# Patient Record
Sex: FEMALE | URBAN - METROPOLITAN AREA
[De-identification: names, ages, dates, MRNs, and addresses within clinical notes are randomized per-mention and may not be internally consistent; named-entity substitution may affect disease eponyms.]

---

## 2018-02-28 NOTE — PATIENT DISCUSSION
CONJUNCTIVITIS (ALLERGIC), Ou__:  PRESCRIBE __maxitrol oint qhs, ou___. RETURN FOR FOLLOW-UP AS SCHEDULED.

## 2019-11-20 ENCOUNTER — IMPORTED ENCOUNTER (OUTPATIENT)
Dept: URBAN - METROPOLITAN AREA CLINIC 1 | Facility: CLINIC | Age: 34
End: 2019-11-20

## 2019-11-20 PROBLEM — H52.13: Noted: 2019-11-20

## 2019-11-20 PROCEDURE — S0621 ROUTINE OPHTHALMOLOGICAL EXA: HCPCS

## 2019-11-20 NOTE — PATIENT DISCUSSION
1. Myopia -- Rx was given for correction if indicated and requested. Return for an appointment in 1 year for a 36 with Dr. Joni Schaumann.

## 2022-04-02 ASSESSMENT — TONOMETRY
OD_IOP_MMHG: 16
OS_IOP_MMHG: 16

## 2022-04-02 ASSESSMENT — VISUAL ACUITY
OD_SC: 20/20
OD_CC: 20/20
OS_SC: 20/20
OS_CC: 20/40+1

## 2022-10-26 ENCOUNTER — ESTABLISHED PATIENT (OUTPATIENT)
Dept: URBAN - METROPOLITAN AREA CLINIC 1 | Facility: CLINIC | Age: 37
End: 2022-10-26

## 2022-10-26 DIAGNOSIS — H52.13: ICD-10-CM

## 2022-10-26 PROCEDURE — 99214 OFFICE O/P EST MOD 30 MIN: CPT

## 2022-10-26 ASSESSMENT — VISUAL ACUITY
OD_CC: J1+
OS_CC: 20/20-1
OD_CC: 20/20
OS_CC: J1+

## 2022-10-26 ASSESSMENT — TONOMETRY
OD_IOP_MMHG: 17
OS_IOP_MMHG: 17

## 2023-11-01 ENCOUNTER — COMPREHENSIVE EXAM (OUTPATIENT)
Dept: URBAN - METROPOLITAN AREA CLINIC 1 | Facility: CLINIC | Age: 38
End: 2023-11-01

## 2023-11-01 DIAGNOSIS — H52.13: ICD-10-CM

## 2023-11-01 DIAGNOSIS — Z01.00: ICD-10-CM

## 2023-11-01 PROCEDURE — 92015 DETERMINE REFRACTIVE STATE: CPT

## 2023-11-01 PROCEDURE — 92014 COMPRE OPH EXAM EST PT 1/>: CPT

## 2023-11-01 ASSESSMENT — TONOMETRY
OS_IOP_MMHG: 17
OD_IOP_MMHG: 15

## 2023-11-01 ASSESSMENT — VISUAL ACUITY
OD_CC: 20/25
OS_CC: 20/25

## 2024-05-21 NOTE — PATIENT DISCUSSION
Daughter of patient stopped in to see if we would be able to draw labs ordered from Dr. Bennett.  There are a large number of orders placed.  Not sure if we are able to draw all here or not.  Please advise   Post-op Yag laser Counseling- Patient instructed that he/she may experience a temporary onset of floater post yag laser. Patient understands to monitor vision for any changes including flashes, decreased vision or loss of vision. Patient told to call office immediately if experiences any changes above. Patient instructed on RD precautions. Patient verbalizes understanding.

## 2024-11-06 ENCOUNTER — COMPREHENSIVE EXAM (OUTPATIENT)
Dept: URBAN - METROPOLITAN AREA CLINIC 1 | Facility: CLINIC | Age: 39
End: 2024-11-06

## 2024-11-06 DIAGNOSIS — Z01.00: ICD-10-CM

## 2024-11-06 DIAGNOSIS — H52.13: ICD-10-CM

## 2024-11-06 PROCEDURE — 92014 COMPRE OPH EXAM EST PT 1/>: CPT

## 2024-11-06 PROCEDURE — 92015 DETERMINE REFRACTIVE STATE: CPT
